# Patient Record
Sex: FEMALE | Race: BLACK OR AFRICAN AMERICAN | NOT HISPANIC OR LATINO | URBAN - METROPOLITAN AREA
[De-identification: names, ages, dates, MRNs, and addresses within clinical notes are randomized per-mention and may not be internally consistent; named-entity substitution may affect disease eponyms.]

---

## 2024-01-08 ENCOUNTER — EMERGENCY (EMERGENCY)
Facility: HOSPITAL | Age: 24
LOS: 1 days | Discharge: HOME | End: 2024-01-08
Attending: EMERGENCY MEDICINE | Admitting: EMERGENCY MEDICINE
Payer: MEDICAID

## 2024-01-08 VITALS
DIASTOLIC BLOOD PRESSURE: 83 MMHG | RESPIRATION RATE: 17 BRPM | HEART RATE: 90 BPM | OXYGEN SATURATION: 99 % | TEMPERATURE: 98 F | SYSTOLIC BLOOD PRESSURE: 120 MMHG

## 2024-01-08 VITALS
DIASTOLIC BLOOD PRESSURE: 69 MMHG | HEART RATE: 71 BPM | SYSTOLIC BLOOD PRESSURE: 103 MMHG | RESPIRATION RATE: 19 BRPM | OXYGEN SATURATION: 100 %

## 2024-01-08 LAB
ANION GAP SERPL CALC-SCNC: 8 MMOL/L — LOW (ref 9–16)
ANION GAP SERPL CALC-SCNC: 8 MMOL/L — LOW (ref 9–16)
BASOPHILS # BLD AUTO: 0.03 K/UL — SIGNIFICANT CHANGE UP (ref 0–0.2)
BASOPHILS # BLD AUTO: 0.03 K/UL — SIGNIFICANT CHANGE UP (ref 0–0.2)
BASOPHILS NFR BLD AUTO: 0.4 % — SIGNIFICANT CHANGE UP (ref 0–2)
BASOPHILS NFR BLD AUTO: 0.4 % — SIGNIFICANT CHANGE UP (ref 0–2)
BUN SERPL-MCNC: 13 MG/DL — SIGNIFICANT CHANGE UP (ref 7–23)
BUN SERPL-MCNC: 13 MG/DL — SIGNIFICANT CHANGE UP (ref 7–23)
CALCIUM SERPL-MCNC: 9.2 MG/DL — SIGNIFICANT CHANGE UP (ref 8.5–10.5)
CALCIUM SERPL-MCNC: 9.2 MG/DL — SIGNIFICANT CHANGE UP (ref 8.5–10.5)
CHLORIDE SERPL-SCNC: 104 MMOL/L — SIGNIFICANT CHANGE UP (ref 96–108)
CHLORIDE SERPL-SCNC: 104 MMOL/L — SIGNIFICANT CHANGE UP (ref 96–108)
CO2 SERPL-SCNC: 26 MMOL/L — SIGNIFICANT CHANGE UP (ref 22–31)
CO2 SERPL-SCNC: 26 MMOL/L — SIGNIFICANT CHANGE UP (ref 22–31)
CREAT SERPL-MCNC: 0.93 MG/DL — SIGNIFICANT CHANGE UP (ref 0.5–1.3)
CREAT SERPL-MCNC: 0.93 MG/DL — SIGNIFICANT CHANGE UP (ref 0.5–1.3)
EGFR: 89 ML/MIN/1.73M2 — SIGNIFICANT CHANGE UP
EGFR: 89 ML/MIN/1.73M2 — SIGNIFICANT CHANGE UP
EOSINOPHIL # BLD AUTO: 0.01 K/UL — SIGNIFICANT CHANGE UP (ref 0–0.5)
EOSINOPHIL # BLD AUTO: 0.01 K/UL — SIGNIFICANT CHANGE UP (ref 0–0.5)
EOSINOPHIL NFR BLD AUTO: 0.1 % — SIGNIFICANT CHANGE UP (ref 0–6)
EOSINOPHIL NFR BLD AUTO: 0.1 % — SIGNIFICANT CHANGE UP (ref 0–6)
GLUCOSE SERPL-MCNC: 103 MG/DL — HIGH (ref 70–99)
GLUCOSE SERPL-MCNC: 103 MG/DL — HIGH (ref 70–99)
HCT VFR BLD CALC: 38.6 % — SIGNIFICANT CHANGE UP (ref 34.5–45)
HCT VFR BLD CALC: 38.6 % — SIGNIFICANT CHANGE UP (ref 34.5–45)
HGB BLD-MCNC: 12.5 G/DL — SIGNIFICANT CHANGE UP (ref 11.5–15.5)
HGB BLD-MCNC: 12.5 G/DL — SIGNIFICANT CHANGE UP (ref 11.5–15.5)
IMM GRANULOCYTES NFR BLD AUTO: 0.3 % — SIGNIFICANT CHANGE UP (ref 0–0.9)
IMM GRANULOCYTES NFR BLD AUTO: 0.3 % — SIGNIFICANT CHANGE UP (ref 0–0.9)
LYMPHOCYTES # BLD AUTO: 1.34 K/UL — SIGNIFICANT CHANGE UP (ref 1–3.3)
LYMPHOCYTES # BLD AUTO: 1.34 K/UL — SIGNIFICANT CHANGE UP (ref 1–3.3)
LYMPHOCYTES # BLD AUTO: 17.4 % — SIGNIFICANT CHANGE UP (ref 13–44)
LYMPHOCYTES # BLD AUTO: 17.4 % — SIGNIFICANT CHANGE UP (ref 13–44)
MCHC RBC-ENTMCNC: 25.3 PG — LOW (ref 27–34)
MCHC RBC-ENTMCNC: 25.3 PG — LOW (ref 27–34)
MCHC RBC-ENTMCNC: 32.4 GM/DL — SIGNIFICANT CHANGE UP (ref 32–36)
MCHC RBC-ENTMCNC: 32.4 GM/DL — SIGNIFICANT CHANGE UP (ref 32–36)
MCV RBC AUTO: 78.1 FL — LOW (ref 80–100)
MCV RBC AUTO: 78.1 FL — LOW (ref 80–100)
MONOCYTES # BLD AUTO: 0.36 K/UL — SIGNIFICANT CHANGE UP (ref 0–0.9)
MONOCYTES # BLD AUTO: 0.36 K/UL — SIGNIFICANT CHANGE UP (ref 0–0.9)
MONOCYTES NFR BLD AUTO: 4.7 % — SIGNIFICANT CHANGE UP (ref 2–14)
MONOCYTES NFR BLD AUTO: 4.7 % — SIGNIFICANT CHANGE UP (ref 2–14)
NEUTROPHILS # BLD AUTO: 5.95 K/UL — SIGNIFICANT CHANGE UP (ref 1.8–7.4)
NEUTROPHILS # BLD AUTO: 5.95 K/UL — SIGNIFICANT CHANGE UP (ref 1.8–7.4)
NEUTROPHILS NFR BLD AUTO: 77.1 % — HIGH (ref 43–77)
NEUTROPHILS NFR BLD AUTO: 77.1 % — HIGH (ref 43–77)
NRBC # BLD: 0 /100 WBCS — SIGNIFICANT CHANGE UP (ref 0–0)
NRBC # BLD: 0 /100 WBCS — SIGNIFICANT CHANGE UP (ref 0–0)
PLATELET # BLD AUTO: 345 K/UL — SIGNIFICANT CHANGE UP (ref 150–400)
PLATELET # BLD AUTO: 345 K/UL — SIGNIFICANT CHANGE UP (ref 150–400)
POTASSIUM SERPL-MCNC: 4.1 MMOL/L — SIGNIFICANT CHANGE UP (ref 3.5–5.3)
POTASSIUM SERPL-MCNC: 4.1 MMOL/L — SIGNIFICANT CHANGE UP (ref 3.5–5.3)
POTASSIUM SERPL-SCNC: 4.1 MMOL/L — SIGNIFICANT CHANGE UP (ref 3.5–5.3)
POTASSIUM SERPL-SCNC: 4.1 MMOL/L — SIGNIFICANT CHANGE UP (ref 3.5–5.3)
RBC # BLD: 4.94 M/UL — SIGNIFICANT CHANGE UP (ref 3.8–5.2)
RBC # BLD: 4.94 M/UL — SIGNIFICANT CHANGE UP (ref 3.8–5.2)
RBC # FLD: 17 % — HIGH (ref 10.3–14.5)
RBC # FLD: 17 % — HIGH (ref 10.3–14.5)
SODIUM SERPL-SCNC: 138 MMOL/L — SIGNIFICANT CHANGE UP (ref 132–145)
SODIUM SERPL-SCNC: 138 MMOL/L — SIGNIFICANT CHANGE UP (ref 132–145)
WBC # BLD: 7.71 K/UL — SIGNIFICANT CHANGE UP (ref 3.8–10.5)
WBC # BLD: 7.71 K/UL — SIGNIFICANT CHANGE UP (ref 3.8–10.5)
WBC # FLD AUTO: 7.71 K/UL — SIGNIFICANT CHANGE UP (ref 3.8–10.5)
WBC # FLD AUTO: 7.71 K/UL — SIGNIFICANT CHANGE UP (ref 3.8–10.5)

## 2024-01-08 PROCEDURE — 70450 CT HEAD/BRAIN W/O DYE: CPT | Mod: 26

## 2024-01-08 PROCEDURE — 12011 RPR F/E/E/N/L/M 2.5 CM/<: CPT

## 2024-01-08 PROCEDURE — 70486 CT MAXILLOFACIAL W/O DYE: CPT | Mod: 26

## 2024-01-08 PROCEDURE — 99285 EMERGENCY DEPT VISIT HI MDM: CPT | Mod: 25

## 2024-01-08 RX ORDER — KETOROLAC TROMETHAMINE 30 MG/ML
15 SYRINGE (ML) INJECTION ONCE
Refills: 0 | Status: DISCONTINUED | OUTPATIENT
Start: 2024-01-08 | End: 2024-01-08

## 2024-01-08 RX ORDER — ACETAMINOPHEN 500 MG
975 TABLET ORAL ONCE
Refills: 0 | Status: COMPLETED | OUTPATIENT
Start: 2024-01-08 | End: 2024-01-08

## 2024-01-08 RX ORDER — SODIUM CHLORIDE 9 MG/ML
1000 INJECTION INTRAMUSCULAR; INTRAVENOUS; SUBCUTANEOUS ONCE
Refills: 0 | Status: COMPLETED | OUTPATIENT
Start: 2024-01-08 | End: 2024-01-08

## 2024-01-08 RX ORDER — LIDOCAINE AND PRILOCAINE CREAM 25; 25 MG/G; MG/G
1 CREAM TOPICAL ONCE
Refills: 0 | Status: COMPLETED | OUTPATIENT
Start: 2024-01-08 | End: 2024-01-08

## 2024-01-08 RX ADMIN — Medication 975 MILLIGRAM(S): at 22:50

## 2024-01-08 RX ADMIN — LIDOCAINE AND PRILOCAINE CREAM 1 APPLICATION(S): 25; 25 CREAM TOPICAL at 23:03

## 2024-01-08 RX ADMIN — SODIUM CHLORIDE 1000 MILLILITER(S): 9 INJECTION INTRAMUSCULAR; INTRAVENOUS; SUBCUTANEOUS at 22:10

## 2024-01-08 RX ADMIN — Medication 15 MILLIGRAM(S): at 23:03

## 2024-01-08 RX ADMIN — Medication 975 MILLIGRAM(S): at 22:10

## 2024-01-08 NOTE — ED PROVIDER NOTE - PATIENT PORTAL LINK FT
You can access the FollowMyHealth Patient Portal offered by [facility] by registering at the following website: [email or web address]. By joining [employer]’s FollowMyHealth portal, you will also be able to view your health information using other applications (apps) compatible with our system. You can access the FollowMyHealth Patient Portal offered by [facility] by registering at the following website: [email or web address]. By joining [employer]’s FollowMyHealth portal, you will also be able to view your health information using other applications (apps) compatible with our system.

## 2024-01-08 NOTE — ED PROVIDER NOTE - NSFOLLOWUPINSTRUCTIONS_ED_ALL_ED_FT
Head Injury, Adult    There are many types of head injuries. Head injuries can be as minor as a small bump, or they can be a serious medical issue. More severe head injuries include:  A jarring injury to the brain (concussion).  A bruise (contusion) of the brain. This means there is bleeding in the brain that can cause swelling.  A cracked skull (skull fracture).  Bleeding in the brain that collects, clots, and forms a bump (hematoma).  After a head injury, most problems occur within the first 24 hours, but side effects may occur up to 7–10 days after the injury. It is important to watch your condition for any changes. You may need to be observed in the emergency department or urgent care, or you may be admitted to the hospital.    What are the causes?  There are many possible causes of a head injury. Serious head injuries may be caused by car accidents, bicycle or motorcycle accidents, sports injuries, falls, or being struck by an object.    What are the symptoms?  Symptoms of a head injury include a contusion, bump, or bleeding at the site of the injury. Other physical symptoms may include:  Headache.  Nausea or vomiting.  Dizziness.  Blurred or double vision.  Being uncomfortable around bright lights or loud noises.  Seizures.  Feeling tired.  Trouble being awakened.  Loss of consciousness.  Mental or emotional symptoms may include:  Irritability.  Confusion and memory problems.  Poor attention and concentration.  Changes in eating or sleeping habits.  Anxiety or depression.  How is this diagnosed?  This condition can usually be diagnosed based on your symptoms, a description of the injury, and a physical exam. You may also have imaging tests done, such as a CT scan or an MRI.    How is this treated?  Treatment for this condition depends on the severity and type of injury you have. The main goal of treatment is to prevent complications and allow the brain time to heal.    Mild head injury    If you have a mild head injury, you may be sent home, and treatment may include:  Observation. A responsible adult should stay with you for 24 hours after your injury and check on you often.  Physical rest.  Brain rest.  Pain medicines.  Severe head injury    If you have a severe head injury, treatment may include:  Close observation. This includes hospitalization with the following care:  Frequent physical exams.  Frequent checks of how your brain and nervous system are working (neurological status).  Checking your blood pressure and oxygen levels.  Medicines to relieve pain, prevent seizures, and decrease brain swelling.  Airway protection and breathing support. This may include using a ventilator.  Treatments that monitor and manage swelling inside the brain.  Brain surgery. This may be needed to:  Remove a collection of blood or blood clots.  Stop the bleeding.  Remove a part of the skull to allow room for the brain to swell.  Follow these instructions at home:  Activity    Rest and avoid activities that are physically hard or tiring.  Make sure you get enough sleep.  Let your brain rest by limiting activities that require a lot of thought or attention, such as:  Watching TV.  Playing memory games and puzzles.  Job-related work or homework.  Working on the computer, using social media, and texting.  Avoid activities that could cause another head injury, such as playing sports, until your health care provider approves. Having another head injury, especially before the first one has healed, can be dangerous.  Ask your health care provider when it is safe for you to return to your regular activities, including work or school. Ask your health care provider for a step-by-step plan for gradually returning to activities.  Ask your health care provider when you can drive, ride a bicycle, or use heavy machinery. Your ability to react may be slower after a brain injury. Do not do these activities if you are dizzy.  Lifestyle      Do not drink alcohol until your health care provider approves. Do not use drugs. Alcohol and certain drugs may slow your recovery and can put you at risk of further injury.  If it is harder than usual to remember things, write them down.  If you are easily distracted, try to do one thing at a time.  Talk with family members or close friends when making important decisions.  Tell your friends, family, a trusted colleague, and  about your injury, symptoms, and restrictions. Have them watch for any new or worsening problems.  General instructions    Take over-the-counter and prescription medicines only as told by your health care provider.  Have someone stay with you for 24 hours after your head injury. This person should watch you for any changes in your symptoms and be ready to seek medical help.  Keep all follow-up visits as told by your health care provider. This is important.  How is this prevented?  Work on improving your balance and strength to avoid falls.  Wear a seat belt when you are in a moving vehicle.  Wear a helmet when riding a bicycle, skiing, or doing any other sport or activity that has a risk of injury.  If you drink alcohol:  Limit how much you use to:  0–1 drink a day for nonpregnant women.  0–2 drinks a day for men.  Be aware of how much alcohol is in your drink. In the [place] one drink equals one 12 oz bottle of beer (355 mL), one 5 oz glass of wine (148 mL), or one 1½ oz glass of hard liquor (44 mL).  Take safety measures in your home, such as:  Removing clutter and tripping hazards from floors and stairways.  Using grab bars in bathrooms and handrails by stairs.  Placing non-slip mats on floors and in bathtubs.  Improving lighting in dim areas.  Where to find more information  Centers for Disease Control and Prevention: www.cdc.gov  Get help right away if:  You have:  A severe headache that is not helped by medicine.  Trouble walking or weakness in your arms and legs.  Clear or bloody fluid coming from your nose or ears.  Changes in your vision.  A seizure.  Increased confusion or irritability.  Your symptoms get worse.  You are sleepier than normal and have trouble staying awake.  You lose your balance.  Your pupils change size.  Your speech is slurred.  Your dizziness gets worse.  You vomit.  These symptoms may represent a serious problem that is an emergency. Do not wait to see if the symptoms will go away. Get medical help right away. Call your local emergency services (911 in the [place] Do not drive yourself to the hospital.    Summary  Head injuries can be minor, or they can be a serious medical issue requiring immediate attention.  Treatment for this condition depends on the severity and type of injury you have.  Have someone stay with you for 24 hours after your injury and check on you often.  Ask your health care provider when it is safe for you to return to your regular activities, including work or school.  Head injury prevention includes wearing a seat belt in a motor vehicle, using a helmet on a bicycle, limiting alcohol use, and taking safety measures in your home.  This information is not intended to replace advice given to you by your health care provider. Make sure you discuss any questions you have with your health care provider.    ------------------------------------------------------------------    Facial Laceration  A facial laceration is a cut (laceration) on the face. It is caused by any injury that cuts or tears the skin or tissues on the face. Facial lacerations can bleed and be painful.    You may need medical attention to stop the bleeding, help the wound heal, lower your risk of infection, and prevent scarring. Lacerations usually heal quickly after treatment.    What are the causes?  This condition may be caused by:  A motor vehicle crash.  A sports injury.  A violent attack.  A fall.  What are the signs or symptoms?  Common symptoms of this condition include:  An obvious cut on the face.  Bleeding.  Pain.  Swelling.  Bruising.  A change in the appearance of the face.  How is this diagnosed?  Your health care provider can diagnose a facial laceration by doing a physical exam and asking how the injury happened. Your health care provider may also check for areas of bleeding, tissue damage, nerve injury, and objects (foreign bodies) in your wound.    How is this treated?  Treatment for a facial laceration depends on how severe and deep the wound is. It also depends on the risk for infection. First, your health care provider will clean the wound to prevent infection. Then, your health care provider will decide whether to close the wound. This depends on how deep the laceration is and how long ago your injury happened. If there is an increased risk of infection, the wound will not be closed.  If your wound needs to be closed:  Your health care provider will use stitches (sutures), skin glue (skin adhesive), or skin adhesive strips to repair the laceration.  Your health care provider may numb the area around your wound by injecting a numbing medicine in and around your laceration before doing the sutures.  Torn skin edges or dead skin may be removed.  If sutures are used, the laceration may be closed in layers. Absorbable sutures will be used for deep tissues and muscle. Removable sutures will be used to close the skin.  You may be given:  Pain medicine.  A tetanus shot.  Oral antibiotic medicines.  Antibiotic ointment.  Follow these instructions at home:  Wound care    Follow instructions from your health care provider about how to take care of your wound. Make sure you:  Wash your hands with soap and water for at least 20 seconds before and after you change your bandage (dressing). If soap and water are not available, use hand .  Change your dressing as told by your health care provider.  Leave sutures, skin adhesive, or adhesive strips in place. These skin closures may need to stay in place for 2 weeks or longer. If adhesive strip edges start to loosen and curl up, you may trim the loose edges. Do not remove adhesive strips completely unless your health care provider tells you to do that.  These instructions will vary depending on how the wound was closed.    For sutures:      Keep the wound clean and dry.  If you were given a dressing, change it at least once a day, or as told by your health care provider. Also change the dressing if it gets wet or dirty.  Wash the wound with soap and water two times a day, or as told by your health care provider. Rinse off the soap with water. Pat the wound dry with a clean towel.  After cleaning, apply a thin layer of antibiotic ointment as told by your health care provider. This helps prevent infection and keeps the dressing from sticking to the wound.  You may shower as usual after the first 24 hours. Do not soak the wound until the sutures are removed.  Return to have your sutures removed as told by your health care provider.  Do not wear makeup in the area of the wound until your health care provider has approved.  For skin adhesive:      You may briefly wet your wound in the shower or bath.  Do not soak or scrub the wound.  Do not swim.  Do not sweat heavily until the skin adhesive has fallen off on its own.  After showering or bathing, gently pat the wound dry with a clean towel.  Do not apply liquid medicine, cream medicine, ointment, or makeup to your wound while the skin adhesive is in place. This may loosen the film before your wound is healed.  If you have a dressing over your wound, be careful not to apply tape directly over the skin adhesive. This may pull off the adhesive before the wound is healed.  Do not spend a long time in the sun or use a tanning lamp while the skin adhesive is in place.  The skin adhesive will usually remain in place for 5–10 days and then naturally fall off the skin. Do not pick at the adhesive film.  For skin adhesive strips:      Keep the wound clean and dry.  Do not let the skin adhesive strips get wet.  Bathe carefully to keep the wound and adhesive strips dry. If the wound gets wet, pat it dry with a clean towel right away.  Skin adhesive strips fall off on their own over time. You may trim the strips as the wound heals. Do not remove skin adhesive strips that are still stuck to the wound.  General instructions    Check your wound area every day for signs of infection. Check for:  More redness, swelling, or pain.  Fluid or blood.  Warmth.  Pus or a bad smell.  Take over-the-counter and prescription medicines only as told by your health care provider.  If you were prescribed an antibiotic medicine, take it or apply it as told by your health care provider. Do not stop using the antibiotic even if you start to feel better.  After the laceration has healed:  Know that it can take a year or two for redness or scarring to fade.  Apply sunscreen to the skin of your healed wound to minimize scarring. Ultraviolet (UV) rays can darken scar tissue.  Contact a health care provider if:  You have a fever. A fever is a body temperature that is 100.4°F (38°C) or higher.  You have more redness, swelling, or pain around your wound.  You have fluid or blood coming from your wound.  Your wound feels warm to the touch.  You have pus or a bad smell coming from your wound.  Get help right away if:  You have a red streak going away from your wound.  Summary  You may need treatment for a facial laceration to prevent infection, stop bleeding, help healing, and prevent scarring.  A deep laceration may be closed with stitches (sutures).  Follow your health care provider's wound care instructions carefully.  This information is not intended to replace advice given to you by your health care provider. Make sure you discuss any questions you have with your health care provider.

## 2024-01-08 NOTE — ED PROVIDER NOTE - CLINICAL SUMMARY MEDICAL DECISION MAKING FREE TEXT BOX
Neuro intact, GCS 15, negative CT head for acute traumatic injury, no acute facial fx. Tetanus UTD, pain well controlled. lip laceration closed w/absorbable suture, sterile dressing applied. Discussed sx and treatment for possible concussion. Given return precautions and anticipatory guidance. Syncope likely vasovagal. NSR on EKG w/no anemia or electrolyte disturbance. VSS in ED.

## 2024-01-08 NOTE — ED PROVIDER NOTE - OBJECTIVE STATEMENT
24 y/o F w/no sig pmhx p/w facial trauma/facial laceration and syncope s/p getting hit in the face with a dodgeball, which hit her phone and pushed her phone into her face, chipping her front teeth and cutting her top lip. Pt then laid down on the ground, saw blood on the floor, became lightheaded, and syncopized with full LOC. No fall/head strike or trauma from fall. No HA, nausea/vomiting, neck pain, chest or extremity pain. Denies focal numbness/tingling/weakness. Tetanus UTD.

## 2024-01-08 NOTE — ED PROVIDER NOTE - CARE PLAN
1 Principal Discharge DX:	Facial laceration  Secondary Diagnosis:	Head injury  Secondary Diagnosis:	Syncope  Secondary Diagnosis:	Tooth injury

## 2024-01-08 NOTE — ED PROCEDURE NOTE - CPROC ED TIME OUT STATEMENT1
“Patient's name, , procedure and correct site were confirmed during the [place] Timeout.” “Patient's name, , procedure and correct site were confirmed during the [place] Timeout.”

## 2024-01-08 NOTE — ED ADULT NURSE NOTE - NSFALLHARMRISKINTERV_ED_ALL_ED
Communicate risk of Fall with Harm to all staff, patient, and family/Provide visual cue: red socks, yellow wristband, yellow gown, etc/Reinforce activity limits and safety measures with patient and family/Bed in lowest position, wheels locked, appropriate side rails in place/Call bell, personal items and telephone in reach/Instruct patient to call for assistance before getting out of bed/chair/stretcher/Non-slip footwear applied when patient is off stretcher/[place] to call system/Physically safe environment - no spills, clutter or unnecessary equipment/Purposeful Proactive Rounding/Room/bathroom lighting operational, light cord in reach Communicate risk of Fall with Harm to all staff, patient, and family/Provide visual cue: red socks, yellow wristband, yellow gown, etc/Reinforce activity limits and safety measures with patient and family/Bed in lowest position, wheels locked, appropriate side rails in place/Call bell, personal items and telephone in reach/Instruct patient to call for assistance before getting out of bed/chair/stretcher/Non-slip footwear applied when patient is off stretcher/[place] to call system/Physically safe environment - no spills, clutter or unnecessary equipment/Purposeful Proactive Rounding/Room/bathroom lighting operational, light cord in reach

## 2024-01-08 NOTE — ED ADULT TRIAGE NOTE - CHIEF COMPLAINT QUOTE
BIBEMS for facial pain after being hit in the face with a dodgeball. denies loc but endorses she saw all the blood on the floor and then passed out. in c-collar via EMS.

## 2024-01-08 NOTE — ED PROVIDER NOTE - PHYSICAL EXAMINATION
VITAL SIGNS: I have reviewed nursing notes and confirm.  CONSTITUTIONAL: Well-developed; well-nourished; in no acute distress.  SKIN: Skin exam is warm and dry, no acute rash.  HEAD: Normocephalic; +0.5cm laceration upper lip above vermillion border w/out FB, + L cheek abrasion  EYES: PERRL, EOM intact; conjunctiva and sclera clear.  ENT: No nasal discharge; airway clear, + chipped teeth 7 & 8 w/out dentin exposure or laxity, + 0.5cm superficial laceration internal upper lip w/out FB or bleeding  NECK: Supple; non tender.  CARD: S1, S2 normal; no murmurs, gallops, or rubs. Regular rate and rhythm.  RESP: Unlabored. No wheezes, rales or rhonchi.  ABD: soft; non-distended; non-tender  EXT: Normal ROM. No cyanosis or edema. Non-ttp all ext, distal pulses intact  NEURO: Alert, oriented. Grossly unremarkable.  PSYCH: Cooperative, appropriate.
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
## 2024-01-11 DIAGNOSIS — S09.93XA UNSPECIFIED INJURY OF FACE, INITIAL ENCOUNTER: ICD-10-CM

## 2024-01-11 DIAGNOSIS — S01.511A LACERATION WITHOUT FOREIGN BODY OF LIP, INITIAL ENCOUNTER: ICD-10-CM

## 2024-01-11 DIAGNOSIS — R55 SYNCOPE AND COLLAPSE: ICD-10-CM

## 2024-01-11 DIAGNOSIS — Z91.018 ALLERGY TO OTHER FOODS: ICD-10-CM
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
## 2025-07-10 ENCOUNTER — EMERGENCY (EMERGENCY)
Facility: HOSPITAL | Age: 25
LOS: 1 days | End: 2025-07-10
Attending: EMERGENCY MEDICINE | Admitting: EMERGENCY MEDICINE
Payer: MEDICAID

## 2025-07-10 VITALS
RESPIRATION RATE: 16 BRPM | HEART RATE: 85 BPM | OXYGEN SATURATION: 99 % | DIASTOLIC BLOOD PRESSURE: 82 MMHG | HEIGHT: 67 IN | SYSTOLIC BLOOD PRESSURE: 113 MMHG | TEMPERATURE: 98 F | WEIGHT: 190.04 LBS

## 2025-07-10 PROCEDURE — 99284 EMERGENCY DEPT VISIT MOD MDM: CPT

## 2025-07-10 NOTE — ED ADULT NURSE NOTE - NSFALLUNIVINTERV_ED_ALL_ED
Bed/Stretcher in lowest position, wheels locked, appropriate side rails in place/Call bell, personal items and telephone in reach/Instruct patient to call for assistance before getting out of bed/chair/stretcher/Non-slip footwear applied when patient is off stretcher/[place] to call system/Physically safe environment - no spills, clutter or unnecessary equipment/Purposeful proactive rounding/Room/bathroom lighting operational, light cord in reach

## 2025-07-10 NOTE — ED ADULT TRIAGE NOTE - CHIEF COMPLAINT QUOTE
pt reports nausea, vomiting, LLQ pain started Sunday; also reports headache, dizziness; denies fall/head injury; also has right shoulder/arm pain with bruise on right upper arm after hitting door last week; currently on her menses which started Saturday; BEFAST negative

## 2025-07-10 NOTE — ED ADULT TRIAGE NOTE - PAIN: PRESENCE, MLM
Received reply from [facility]: strips for August and September are not available  
complains of pain/discomfort

## 2025-07-11 VITALS
RESPIRATION RATE: 18 BRPM | HEART RATE: 73 BPM | SYSTOLIC BLOOD PRESSURE: 104 MMHG | OXYGEN SATURATION: 98 % | TEMPERATURE: 99 F | DIASTOLIC BLOOD PRESSURE: 70 MMHG

## 2025-07-11 LAB
ALBUMIN SERPL ELPH-MCNC: 3.7 G/DL — SIGNIFICANT CHANGE UP (ref 3.4–5)
ALP SERPL-CCNC: 95 U/L — SIGNIFICANT CHANGE UP (ref 40–120)
ALT FLD-CCNC: 23 U/L — SIGNIFICANT CHANGE UP (ref 12–42)
ANION GAP SERPL CALC-SCNC: 11 MMOL/L — SIGNIFICANT CHANGE UP (ref 9–16)
AST SERPL-CCNC: 34 U/L — SIGNIFICANT CHANGE UP (ref 15–37)
BASOPHILS # BLD AUTO: 0.03 K/UL — SIGNIFICANT CHANGE UP (ref 0–0.2)
BASOPHILS NFR BLD AUTO: 0.4 % — SIGNIFICANT CHANGE UP (ref 0–2)
BILIRUB SERPL-MCNC: 1 MG/DL — SIGNIFICANT CHANGE UP (ref 0.2–1.2)
BUN SERPL-MCNC: 12 MG/DL — SIGNIFICANT CHANGE UP (ref 7–23)
CALCIUM SERPL-MCNC: 9.3 MG/DL — SIGNIFICANT CHANGE UP (ref 8.5–10.5)
CHLORIDE SERPL-SCNC: 104 MMOL/L — SIGNIFICANT CHANGE UP (ref 96–108)
CO2 SERPL-SCNC: 26 MMOL/L — SIGNIFICANT CHANGE UP (ref 22–31)
CREAT SERPL-MCNC: 0.96 MG/DL — SIGNIFICANT CHANGE UP (ref 0.5–1.3)
EGFR: 85 ML/MIN/1.73M2 — SIGNIFICANT CHANGE UP
EGFR: 85 ML/MIN/1.73M2 — SIGNIFICANT CHANGE UP
EOSINOPHIL # BLD AUTO: 0.02 K/UL — SIGNIFICANT CHANGE UP (ref 0–0.5)
EOSINOPHIL NFR BLD AUTO: 0.3 % — SIGNIFICANT CHANGE UP (ref 0–6)
GLUCOSE SERPL-MCNC: 85 MG/DL — SIGNIFICANT CHANGE UP (ref 70–99)
HCG SERPL-ACNC: <1 MIU/ML — SIGNIFICANT CHANGE UP
HCT VFR BLD CALC: 38 % — SIGNIFICANT CHANGE UP (ref 34.5–45)
HCV AB S/CO SERPL IA: 0.16 S/CO — SIGNIFICANT CHANGE UP (ref 0–0.79)
HCV AB SERPL-IMP: SIGNIFICANT CHANGE UP
HGB BLD-MCNC: 11.9 G/DL — SIGNIFICANT CHANGE UP (ref 11.5–15.5)
HIV 1 & 2 AB SERPL IA.RAPID: SIGNIFICANT CHANGE UP
IMM GRANULOCYTES # BLD AUTO: 0.02 K/UL — SIGNIFICANT CHANGE UP (ref 0–0.07)
IMM GRANULOCYTES NFR BLD AUTO: 0.3 % — SIGNIFICANT CHANGE UP (ref 0–0.9)
LIDOCAIN IGE QN: 28 U/L — SIGNIFICANT CHANGE UP (ref 16–77)
LYMPHOCYTES # BLD AUTO: 1.97 K/UL — SIGNIFICANT CHANGE UP (ref 1–3.3)
LYMPHOCYTES NFR BLD AUTO: 25.7 % — SIGNIFICANT CHANGE UP (ref 13–44)
MCHC RBC-ENTMCNC: 23.9 PG — LOW (ref 27–34)
MCHC RBC-ENTMCNC: 31.3 G/DL — LOW (ref 32–36)
MCV RBC AUTO: 76.3 FL — LOW (ref 80–100)
MONOCYTES # BLD AUTO: 0.54 K/UL — SIGNIFICANT CHANGE UP (ref 0–0.9)
MONOCYTES NFR BLD AUTO: 7 % — SIGNIFICANT CHANGE UP (ref 2–14)
NEUTROPHILS # BLD AUTO: 5.1 K/UL — SIGNIFICANT CHANGE UP (ref 1.8–7.4)
NEUTROPHILS NFR BLD AUTO: 66.3 % — SIGNIFICANT CHANGE UP (ref 43–77)
NRBC # BLD AUTO: 0 K/UL — SIGNIFICANT CHANGE UP (ref 0–0)
NRBC # FLD: 0 K/UL — SIGNIFICANT CHANGE UP (ref 0–0)
NRBC BLD AUTO-RTO: 0 /100 WBCS — SIGNIFICANT CHANGE UP (ref 0–0)
PLATELET # BLD AUTO: 355 K/UL — SIGNIFICANT CHANGE UP (ref 150–400)
PMV BLD: 10.3 FL — SIGNIFICANT CHANGE UP (ref 7–13)
POTASSIUM SERPL-MCNC: 4 MMOL/L — SIGNIFICANT CHANGE UP (ref 3.5–5.3)
POTASSIUM SERPL-SCNC: 4 MMOL/L — SIGNIFICANT CHANGE UP (ref 3.5–5.3)
PROT SERPL-MCNC: 8.5 G/DL — HIGH (ref 6.4–8.2)
RBC # BLD: 4.98 M/UL — SIGNIFICANT CHANGE UP (ref 3.8–5.2)
RBC # FLD: 16.6 % — HIGH (ref 10.3–14.5)
SODIUM SERPL-SCNC: 141 MMOL/L — SIGNIFICANT CHANGE UP (ref 132–145)
WBC # BLD: 7.68 K/UL — SIGNIFICANT CHANGE UP (ref 3.8–10.5)
WBC # FLD AUTO: 7.68 K/UL — SIGNIFICANT CHANGE UP (ref 3.8–10.5)

## 2025-07-11 RX ORDER — ONDANSETRON HCL/PF 4 MG/2 ML
4 VIAL (ML) INJECTION ONCE
Refills: 0 | Status: COMPLETED | OUTPATIENT
Start: 2025-07-11 | End: 2025-07-11

## 2025-07-11 RX ORDER — ONDANSETRON HCL/PF 4 MG/2 ML
1 VIAL (ML) INJECTION
Qty: 1 | Refills: 0
Start: 2025-07-11 | End: 2025-07-13

## 2025-07-11 RX ORDER — KETOROLAC TROMETHAMINE 30 MG/ML
15 INJECTION, SOLUTION INTRAMUSCULAR; INTRAVENOUS ONCE
Refills: 0 | Status: DISCONTINUED | OUTPATIENT
Start: 2025-07-11 | End: 2025-07-11

## 2025-07-11 RX ADMIN — Medication 1000 MILLILITER(S): at 02:02

## 2025-07-11 RX ADMIN — KETOROLAC TROMETHAMINE 15 MILLIGRAM(S): 30 INJECTION, SOLUTION INTRAMUSCULAR; INTRAVENOUS at 02:02

## 2025-07-11 RX ADMIN — Medication 4 MILLIGRAM(S): at 02:02

## 2025-07-11 NOTE — ED PROVIDER NOTE - CLINICAL SUMMARY MEDICAL DECISION MAKING FREE TEXT BOX
A/P: 24-year-old female with no significant past medical history other than insomnia presenting to the emergency room complaining of nausea, vomiting, abdominal cramping, constipation.  Patient states that she often has difficulty passing stools and is often constipated.  States that she has been having nausea, cramping for the past 6 days, states that pain is concentrated in the left upper quadrant but is also generalized.  No fevers or chills.  States that she is also having cramping throughout her body in her muscles, also in her right shoulder, believe that she has a muscle spasm in the deltoid.  States that she is unable to tolerate liquids, states that she has vomited nearly 12 times between today and yesterday, nonbilious, nonbloody.  Also complaining of a headache and dizziness.  -- Differential includes but not limited to gastroenteritis, gastritis, indigestion, IBD, IBS  -- Plan to treat symptomatically with fluids, Toradol, Zofran  -- Not concern for acute appendicitis or other surgical emergency at this time, will defer on imaging at this

## 2025-07-11 NOTE — ED PROVIDER NOTE - OBJECTIVE STATEMENT
24-year-old female with no significant past medical history other than insomnia presenting to the emergency room complaining of nausea, vomiting, abdominal cramping, constipation.  Patient states that she often has difficulty passing stools and is often constipated.  States that she has been having nausea, cramping for the past 6 days, states that pain is concentrated in the left upper quadrant but is also generalized.  No fevers or chills.  States that she is also having cramping throughout her body in her muscles, also in her right shoulder, believe that she has a muscle spasm in the deltoid.  States that she is unable to tolerate liquids, states that she has vomited nearly 12 times between today and yesterday, nonbilious, nonbloody.  Also complaining of a headache and dizziness.

## 2025-07-11 NOTE — ED PROVIDER NOTE - PROGRESS NOTE DETAILS
Patient reports that she's feeling much better, no longer having abdominal pain/nausea, eating crackers and drinking water.  Advised patient to take plenty of fluids, rest, and avoid caffeinated substances.

## 2025-07-11 NOTE — ED PROVIDER NOTE - PHYSICAL EXAMINATION
Constitutional:  Well appearing, awake, alert, oriented  and in no apparent distress.  HEENT: Airway patent, Nasal mucosa clear. Mouth with normal mucosa.   Eyes: Clear bilaterally, pupils equal, round and reactive to light.  Cardiac: Normal rate, regular rhythm.  Respiratory: Breath sounds clear and equal bilaterally.  GI: Abdomen soft, minimal tenderness to palpation in LUQ, no rebound/guarding   MSK: Spine appears normal, range of motion is not limited, no muscle or joint tenderness  Neuro: Alert and oriented, no focal deficits, no motor or sensory deficits.  Skin: Skin normal color for race, warm, dry and intact. No evidence of rash.

## 2025-07-11 NOTE — ED PROVIDER NOTE - PATIENT PORTAL LINK FT
You can access the FollowMyHealth Patient Portal offered by [facility] by registering at the following website: [email or web address]. By joining [employer]’s FollowMyHealth portal, you will also be able to view your health information using other applications (apps) compatible with our system.

## 2025-07-11 NOTE — ED PROVIDER NOTE - NSFOLLOWUPINSTRUCTIONS_ED_ALL_ED_FT
Nausea and Vomiting, Adult  Nausea is the feeling that you have an upset stomach or that you are about to vomit. As nausea gets worse, it can lead to vomiting. Vomiting is when stomach contents forcefully come out of your mouth as a result of nausea. Vomiting can make you feel weak and cause you to become dehydrated.    Dehydration can make you feel tired and thirsty, cause you to have a dry mouth, and decrease how often you urinate. Older adults and people with other diseases or a weak disease-fighting system (immune system) are at higher risk for dehydration. It is important to treat your nausea and vomiting as told by your health care provider.    Follow these instructions at home:  Watch your symptoms for any changes. Tell your health care provider about them.    Eating and drinking    A bottle of clear fruit juice and glass of water.  A sign showing that a person should not drink alcohol.  Take an oral rehydration solution (ORS). This is a drink that is sold at pharmacies and retail stores.  Drink clear fluids slowly and in small amounts as you are able. Clear fluids include water, ice chips, low-calorie sports drinks, and fruit juice that has water added (diluted fruit juice).  Eat bland, easy-to-digest foods in small amounts as you are able. These foods include bananas, applesauce, rice, lean meats, toast, and crackers.  Avoid fluids that contain a lot of sugar or caffeine, such as energy drinks, sports drinks, and soda.  Avoid alcohol.  Avoid spicy or fatty foods.  General instructions    Take over-the-counter and prescription medicines only as told by your health care provider.  Drink enough fluid to keep your urine pale yellow.  Wash your hands often using soap and water for at least 20 seconds. If soap and water are not available, use hand .  Make sure that everyone in your household washes their hands well and often.  Rest at home while you recover.  Watch your condition for any changes.  Take slow and deep breaths when you feel nauseous.  Keep all follow-up visits. This is important.  Contact a health care provider if:  Your symptoms get worse.  You have new symptoms.  You have a fever.  You cannot drink fluids without vomiting.  Your nausea does not go away after 2 days.  You feel light-headed or dizzy.  You have a headache.  You have muscle cramps.  You have a rash.  You have pain while urinating.  Get help right away if:  You have pain in your chest, neck, arm, or jaw.  You feel extremely weak or you faint.  You have persistent vomiting.  You have vomit that is bright red or looks like black coffee grounds.  You have bloody or black stools (feces) or stools that look like tar.  You have a severe headache, a stiff neck, or both.  You have severe pain, cramping, or bloating in your abdomen.  You have difficulty breathing, or you are breathing very quickly.  Your heart is beating very quickly.  Your skin feels cold and clammy.  You feel confused.  You have signs of dehydration, such as:  Dark urine, very little urine, or no urine.  Cracked lips.  Dry mouth.  Sunken eyes.  Sleepiness.  Weakness.  These symptoms may be an emergency. Get help right away. Call 911.  Do not wait to see if the symptoms will go away.  Do not drive yourself to the hospital.  Summary  Nausea is the feeling that you have an upset stomach or that you are about to vomit. As nausea gets worse, it can lead to vomiting. Vomiting can make you feel weak and cause you to become dehydrated.  Follow instructions from your health care provider about eating and drinking to prevent dehydration.  Take over-the-counter and prescription medicines only as told by your health care provider.  Contact your health care provider if your symptoms get worse, or you have new symptoms.  Keep all follow-up visits. This is important.  This information is not intended to replace advice given to you by your health care provider. Make sure you discuss any questions you have with your health care provider.    Document Revised: 06/23/2022 Document Reviewed: 06/24/2022  [employer] Patient Education © 2025 [employer]

## 2025-07-14 DIAGNOSIS — R11.2 NAUSEA WITH VOMITING, UNSPECIFIED: ICD-10-CM

## 2025-07-14 DIAGNOSIS — R51.9 HEADACHE, UNSPECIFIED: ICD-10-CM

## 2025-07-14 DIAGNOSIS — R42 DIZZINESS AND GIDDINESS: ICD-10-CM

## 2025-07-14 DIAGNOSIS — Z91.018 ALLERGY TO OTHER FOODS: ICD-10-CM

## 2025-07-14 DIAGNOSIS — R10.12 LEFT UPPER QUADRANT PAIN: ICD-10-CM

## 2025-07-14 DIAGNOSIS — G47.00 INSOMNIA, UNSPECIFIED: ICD-10-CM

## 2025-07-14 DIAGNOSIS — K59.00 CONSTIPATION, UNSPECIFIED: ICD-10-CM
